# Patient Record
Sex: FEMALE | Race: WHITE
[De-identification: names, ages, dates, MRNs, and addresses within clinical notes are randomized per-mention and may not be internally consistent; named-entity substitution may affect disease eponyms.]

---

## 2019-11-04 ENCOUNTER — HOSPITAL ENCOUNTER (EMERGENCY)
Dept: HOSPITAL 61 - ER | Age: 79
Discharge: HOME | End: 2019-11-04
Payer: MEDICARE

## 2019-11-04 VITALS — DIASTOLIC BLOOD PRESSURE: 68 MMHG | SYSTOLIC BLOOD PRESSURE: 156 MMHG

## 2019-11-04 VITALS — HEIGHT: 67 IN | BODY MASS INDEX: 29.19 KG/M2 | WEIGHT: 186 LBS

## 2019-11-04 DIAGNOSIS — E11.9: ICD-10-CM

## 2019-11-04 DIAGNOSIS — R51: ICD-10-CM

## 2019-11-04 DIAGNOSIS — I10: ICD-10-CM

## 2019-11-04 DIAGNOSIS — E78.00: ICD-10-CM

## 2019-11-04 DIAGNOSIS — Z88.8: ICD-10-CM

## 2019-11-04 DIAGNOSIS — R11.0: ICD-10-CM

## 2019-11-04 DIAGNOSIS — R19.7: ICD-10-CM

## 2019-11-04 DIAGNOSIS — R42: Primary | ICD-10-CM

## 2019-11-04 LAB
ALBUMIN SERPL-MCNC: 3.7 G/DL (ref 3.4–5)
ALBUMIN/GLOB SERPL: 1.2 {RATIO} (ref 1–1.7)
ALP SERPL-CCNC: 67 U/L (ref 46–116)
ALT SERPL-CCNC: 16 U/L (ref 14–59)
ANION GAP SERPL CALC-SCNC: 10 MMOL/L (ref 6–14)
AST SERPL-CCNC: 13 U/L (ref 15–37)
BASOPHILS # BLD AUTO: 0.1 X10^3/UL (ref 0–0.2)
BASOPHILS NFR BLD: 2 % (ref 0–3)
BILIRUB SERPL-MCNC: 0.3 MG/DL (ref 0.2–1)
BUN SERPL-MCNC: 16 MG/DL (ref 7–20)
BUN/CREAT SERPL: 23 (ref 6–20)
CALCIUM SERPL-MCNC: 9 MG/DL (ref 8.5–10.1)
CHLORIDE SERPL-SCNC: 104 MMOL/L (ref 98–107)
CO2 SERPL-SCNC: 26 MMOL/L (ref 21–32)
CREAT SERPL-MCNC: 0.7 MG/DL (ref 0.6–1)
EOSINOPHIL NFR BLD: 0.1 X10^3/UL (ref 0–0.7)
EOSINOPHIL NFR BLD: 1 % (ref 0–3)
ERYTHROCYTE [DISTWIDTH] IN BLOOD BY AUTOMATED COUNT: 14.1 % (ref 11.5–14.5)
GFR SERPLBLD BASED ON 1.73 SQ M-ARVRAT: 80.7 ML/MIN
GLOBULIN SER-MCNC: 3 G/DL (ref 2.2–3.8)
GLUCOSE SERPL-MCNC: 203 MG/DL (ref 70–99)
HCT VFR BLD CALC: 38.9 % (ref 36–47)
HGB BLD-MCNC: 13.2 G/DL (ref 12–15.5)
LYMPHOCYTES # BLD: 0.8 X10^3/UL (ref 1–4.8)
LYMPHOCYTES NFR BLD AUTO: 16 % (ref 24–48)
MCH RBC QN AUTO: 29 PG (ref 25–35)
MCHC RBC AUTO-ENTMCNC: 34 G/DL (ref 31–37)
MCV RBC AUTO: 84 FL (ref 79–100)
MONO #: 0.2 X10^3/UL (ref 0–1.1)
MONOCYTES NFR BLD: 5 % (ref 0–9)
NEUT #: 3.8 X10^3/UL (ref 1.8–7.7)
NEUTROPHILS NFR BLD AUTO: 76 % (ref 31–73)
PLATELET # BLD AUTO: 218 X10^3/UL (ref 140–400)
POTASSIUM SERPL-SCNC: 4 MMOL/L (ref 3.5–5.1)
PROT SERPL-MCNC: 6.7 G/DL (ref 6.4–8.2)
RBC # BLD AUTO: 4.62 X10^6/UL (ref 3.5–5.4)
SODIUM SERPL-SCNC: 140 MMOL/L (ref 136–145)
WBC # BLD AUTO: 4.9 X10^3/UL (ref 4–11)

## 2019-11-04 PROCEDURE — 36415 COLL VENOUS BLD VENIPUNCTURE: CPT

## 2019-11-04 PROCEDURE — 96374 THER/PROPH/DIAG INJ IV PUSH: CPT

## 2019-11-04 PROCEDURE — 84484 ASSAY OF TROPONIN QUANT: CPT

## 2019-11-04 PROCEDURE — 96361 HYDRATE IV INFUSION ADD-ON: CPT

## 2019-11-04 PROCEDURE — 99285 EMERGENCY DEPT VISIT HI MDM: CPT

## 2019-11-04 PROCEDURE — 85025 COMPLETE CBC W/AUTO DIFF WBC: CPT

## 2019-11-04 PROCEDURE — 93005 ELECTROCARDIOGRAM TRACING: CPT

## 2019-11-04 PROCEDURE — 70450 CT HEAD/BRAIN W/O DYE: CPT

## 2019-11-04 PROCEDURE — 80053 COMPREHEN METABOLIC PANEL: CPT

## 2019-11-04 NOTE — PHYS DOC
Past Medical History


Past Medical History:  Diabetes-Type II, High Cholesterol, Hypertension


Alcohol Use:  None





Adult General


Chief Complaint


Chief Complaint:  DIZZY/LIGHT HEADED





HPI


HPI


Patient is a 79-year-old female who presents to the emergency department for 

evaluation. She states that she awakened this morning with a mild headache, 

which she will occasionally get, but that resolved. She states that she had the 

sudden onset of dizziness, described as a sense of rotation, which is 

accompanied by several episodes of vomiting, as well as several episodes of lo

ose, watery stools. Her emesis and stools have been nonbloody. She denies any 

pain currently, including headache, chest pain, short of breath, or abdominal 

pain. She has not had any vision changes, hearing changes (although she admits 

to chronic tinnitus, but this is unchanged), numbness, or focal weakness. There 

are no alleviating or exacerbating factors to her symptoms.





Review of Systems


Review of Systems





Constitutional: Denies fever or chills []


Eyes: Denies change in visual acuity, redness, or eye pain []


HENT: Denies nasal congestion or sore throat []


Respiratory: Denies cough or shortness of breath []


Cardiovascular: The patient denies any shortness of breath, chest pain, 

palpitations, or orthopnea []


GI: Denies abdominal pain. No additional information not addressed in HPI []


: Denies dysuria or hematuria []


Musculoskeletal: Denies back pain or joint pain []


Integument: Denies rash or skin lesions []


Neurologic: Denies headache, focal weakness or sensory changes []


Endocrine: Denies polyuria or polydipsia []





All other systems were reviewed and found to be within normal limits, except as 

documented in this note.





Current Medications


Current Medications





Current Medications








 Medications


  (Trade)  Dose


 Ordered  Sig/Faisal  Start Time


 Stop Time Status Last Admin


Dose Admin


 


 Meclizine HCl


  (Antivert)  25 mg  1X  ONCE  11/4/19 10:30


 11/4/19 10:31 DC 11/4/19 10:43


25 MG


 


 Ondansetron HCl


  (Zofran)  4 mg  1X  ONCE  11/4/19 10:30


 11/4/19 10:31 DC 11/4/19 10:44


4 MG


 


 Sodium Chloride  1,000 ml @ 


 1,000 mls/hr  Q1H  11/4/19 10:30


 11/4/19 11:29 DC 11/4/19 10:44


1,000 MLS/HR











Allergies


Allergies





Allergies








Coded Allergies Type Severity Reaction Last Updated Verified


 


  lisinopril Allergy Unknown  11/4/19 Yes











Physical Exam


Physical Exam


PHYSICAL EXAM:





CONSTITUTIONAL: Well developed, well nourished


HEAD: normocephalic, atraumatic


EENT: PERRL, EOMI, with the exception that abduction laterally in the right eye 

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 

with. There is no nystagmus. Conjunctivae normal color, sclerae non-icteric; 

moist mucous membranes.


NECK: Supple, non-tender; no meningismus.


LUNGS: Lungs CTA, breathing even and unlabored. Normal air movement.


HEART: Regular rate and rhythm, no murmur


CHEST: No deformity; non-tender


ABDOMEN: The abdomen is soft, and non-tender, no masses or bruits.


EXTREM: Normal ROM; no deformity, no calf tenderness. Normal pulses palpable in 

all extremities. There is no pedal edema.


SKIN: No rash; no diaphoresis


NEURO: Alert; normal speech and cognition; CN's grossly intact; strength grossly

 intact without focal deficit. Finger-nose-finger and heel shin testing is n

ormal. Visual fields are intact by confrontation. NIH stroke scale score is 0.


BACK: No CVA TTP.





Current Patient Data


Vital Signs





                                   Vital Signs








  Date Time  Temp Pulse Resp B/P (MAP) Pulse Ox O2 Delivery O2 Flow Rate FiO2


 


11/4/19 09:22 97.8 68 24 162/78 (106) 97 Room Air  





 97.8       








Lab Values





                                Laboratory Tests








Test


 11/4/19


10:03


 


White Blood Count


 4.9 x10^3/uL


(4.0-11.0)


 


Red Blood Count


 4.62 x10^6/uL


(3.50-5.40)


 


Hemoglobin


 13.2 g/dL


(12.0-15.5)


 


Hematocrit


 38.9 %


(36.0-47.0)


 


Mean Corpuscular Volume


 84 fL ()





 


Mean Corpuscular Hemoglobin 29 pg (25-35)  


 


Mean Corpuscular Hemoglobin


Concent 34 g/dL


(31-37)


 


Red Cell Distribution Width


 14.1 %


(11.5-14.5)


 


Platelet Count


 218 x10^3/uL


(140-400)


 


Neutrophils (%) (Auto) 76 % (31-73)  H


 


Lymphocytes (%) (Auto) 16 % (24-48)  L


 


Monocytes (%) (Auto) 5 % (0-9)  


 


Eosinophils (%) (Auto) 1 % (0-3)  


 


Basophils (%) (Auto) 2 % (0-3)  


 


Neutrophils # (Auto)


 3.8 x10^3/uL


(1.8-7.7)


 


Lymphocytes # (Auto)


 0.8 x10^3/uL


(1.0-4.8)  L


 


Monocytes # (Auto)


 0.2 x10^3/uL


(0.0-1.1)


 


Eosinophils # (Auto)


 0.1 x10^3/uL


(0.0-0.7)


 


Basophils # (Auto)


 0.1 x10^3/uL


(0.0-0.2)


 


Sodium Level


 140 mmol/L


(136-145)


 


Potassium Level


 4.0 mmol/L


(3.5-5.1)


 


Chloride Level


 104 mmol/L


()


 


Carbon Dioxide Level


 26 mmol/L


(21-32)


 


Anion Gap 10 (6-14)  


 


Blood Urea Nitrogen


 16 mg/dL


(7-20)


 


Creatinine


 0.7 mg/dL


(0.6-1.0)


 


Estimated GFR


(Cockcroft-Gault) 80.7  





 


BUN/Creatinine Ratio 23 (6-20)  H


 


Glucose Level


 203 mg/dL


(70-99)  H


 


Calcium Level


 9.0 mg/dL


(8.5-10.1)


 


Total Bilirubin


 0.3 mg/dL


(0.2-1.0)


 


Aspartate Amino Transferase


(AST) 13 U/L (15-37)


L


 


Alanine Aminotransferase (ALT)


 16 U/L (14-59)





 


Alkaline Phosphatase


 67 U/L


()


 


Troponin I Quantitative


 < 0.017 ng/mL


(0.000-0.055)


 


Total Protein


 6.7 g/dL


(6.4-8.2)


 


Albumin


 3.7 g/dL


(3.4-5.0)


 


Albumin/Globulin Ratio 1.2 (1.0-1.7)  





                                Laboratory Tests


11/4/19 10:03








                                Laboratory Tests


11/4/19 10:03











EKG


EKG


Normal sinus rhythm at a rate of 65 beats for minute, left axis deviation, 

normal intervals. There are no acute ischemic ST/T changes.[]





Radiology/Procedures


Radiology/Procedures


PROCEDURE: CT HEAD WO CONTRAST





 


CT HEAD


 


INDICATION: Dizziness


 


COMPARISON: None Available.


 


Exposure: One or more of the following individualized dose reduction 


techniques were utilized for this examination:  1. Automated exposure 


control  2. Adjustment of the mA and/or kV according to patient size  3. 


Use of iterative reconstruction technique


 


TECHNIQUE: 5 mm contiguous axial images were obtained from the skull base 


to the vertex in both bone and soft tissue algorithm.  


 


FINDINGS: 


 


No abnormal attenuation within the brain parenchyma.  


 


No evidence of acute intracranial hemorrhage.   No extra-axial fluid 


collections.


 


No mass effect or midline shift. Ventricular size is appropriate.  Basal 


cisterns are patent.


 


No fractures identified.Gray-white differentiation is preserved.Globes and


orbits are within normal limits.   Paranasal sinuses and mastoid air cells


are clear.   


 


IMPRESSION:


 


No acute intracranial findings 


 []





Course & Med Decision Making


Course & Med Decision Making


Pertinent Labs and Imaging studies reviewed. (See chart for details)





[]11:45 AM: The patient's condition remains stable. She is feeling significantly

 better. She is ambulatory without any dizziness or any symptoms at this time. 

She is completely asymptomatic. I discussed test results with the patient and 

her family, expectant management, symptomatic treatment, the need for close PCP 

follow-up and return precautions.





Dragon Disclaimer


Dragon Disclaimer


This electronic medical record was generated, in whole or in part, using a voice

 recognition dictation system.





Departure


Departure


Impression:  


   Primary Impression:  


   Dizziness


Disposition:  01 HOME, SELF-CARE


Condition:  STABLE


Patient Instructions:  Diarrhea, Dizziness, Nausea and Vomiting, Vertigo


Scripts


Meclizine Hcl (MECLIZINE HCL) 25 Mg Tablet


1 TAB PO PRN TID PRN for dizziness, #20 TAB


   Prov: OFE DÍAZ MD         11/4/19 


Ondansetron Hcl (ZOFRAN) 4 Mg Tablet


1 TAB PO Q6HRS PRN for NAUSEA/VOMITING, #20 TAB


   Prov: OFE DÍAZ MD         11/4/19











OFE DÍAZ MD            Nov 4, 2019 09:53

## 2019-11-04 NOTE — EKG
[facility]

              [address] [place] [address]

Test Date:    2019               Test Time:    09:34:38

Pat Name:     TREMAINE ROMERO            Department:   

Patient ID:   [id number]           Room:          

Gender:       F                        Technician:   

:          [id number]               Requested By: OFE DÍAZ

Order Number: [id number]           Reading MD:     

                                 Measurements

Intervals                              Axis          

Rate:         65                       P:            42

NM:           170                      QRS:          -6

QRSD:         88                       T:            49

QT:           418                                    

QTc:          440                                    

                           Interpretive Statements

SINUS RHYTHM

LEFT ATRIAL ABNORMALITY

LEFTWARD AXIS

QRS(T) CONTOUR ABNORMALITY

CONSIDER ANTEROLATERAL MYOCARDIAL DAMAGE

ABNORMAL ECG

RI6.01

No previous ECG available for comparison

## 2019-11-04 NOTE — RAD
CT HEAD

 

INDICATION: Dizziness

 

COMPARISON: None Available.

 

Exposure: One or more of the following individualized dose reduction 

techniques were utilized for this examination:  1. Automated exposure 

control  2. Adjustment of the mA and/or kV according to patient size  3. 

Use of iterative reconstruction technique

 

TECHNIQUE: 5 mm contiguous axial images were obtained from the skull base 

to the vertex in both bone and soft tissue algorithm.  

 

FINDINGS: 

 

No abnormal attenuation within the brain parenchyma.  

 

No evidence of acute intracranial hemorrhage.   No extra-axial fluid 

collections.

 

No mass effect or midline shift. Ventricular size is appropriate.  Basal 

cisterns are patent.

 

No fractures identified.Gray-white differentiation is preserved.Globes and

orbits are within normal limits.   Paranasal sinuses and mastoid air cells

are clear.   

 

IMPRESSION:

 

No acute intracranial findings 

 

Electronically signed by: Dennis Johnson MD (11/4/2019 11:01 AM) [id number]